# Patient Record
Sex: FEMALE
[De-identification: names, ages, dates, MRNs, and addresses within clinical notes are randomized per-mention and may not be internally consistent; named-entity substitution may affect disease eponyms.]

---

## 2017-01-20 ENCOUNTER — APPOINTMENT (OUTPATIENT)
Dept: SURGERY | Facility: CLINIC | Age: 67
End: 2017-01-20

## 2017-01-23 LAB
T3 SERPL-MCNC: 99 NG/DL
T4 FREE SERPL-MCNC: 1.8 NG/DL
THYROGLOB AB SERPL-ACNC: <20 IU/ML
THYROGLOB AB SERPL-ACNC: <20 IU/ML
THYROGLOB SERPL-MCNC: <0.2 NG/ML
THYROPEROXIDASE AB SERPL IA-ACNC: 20 IU/ML
TSH SERPL-ACNC: 0.48 UIU/ML

## 2017-09-27 ENCOUNTER — RESULT REVIEW (OUTPATIENT)
Age: 67
End: 2017-09-27

## 2018-04-11 ENCOUNTER — LABORATORY RESULT (OUTPATIENT)
Age: 68
End: 2018-04-11

## 2018-04-11 ENCOUNTER — APPOINTMENT (OUTPATIENT)
Dept: SURGERY | Facility: CLINIC | Age: 68
End: 2018-04-11
Payer: MEDICARE

## 2018-04-11 PROCEDURE — 99213 OFFICE O/P EST LOW 20 MIN: CPT

## 2018-04-11 PROCEDURE — 36415 COLL VENOUS BLD VENIPUNCTURE: CPT

## 2018-04-16 LAB
T3 SERPL-MCNC: 97 NG/DL
T4 FREE SERPL-MCNC: 1.6 NG/DL
THYROGLOB AB SERPL-ACNC: <20 IU/ML
THYROGLOB SERPL-MCNC: <0.2 NG/ML
TSH SERPL-ACNC: 2.52 UIU/ML

## 2018-11-07 ENCOUNTER — RESULT REVIEW (OUTPATIENT)
Age: 68
End: 2018-11-07

## 2019-06-19 ENCOUNTER — APPOINTMENT (OUTPATIENT)
Dept: SURGERY | Facility: CLINIC | Age: 69
End: 2019-06-19
Payer: MEDICARE

## 2019-06-19 PROCEDURE — 99213 OFFICE O/P EST LOW 20 MIN: CPT

## 2019-06-19 PROCEDURE — 36415 COLL VENOUS BLD VENIPUNCTURE: CPT

## 2019-06-19 NOTE — HISTORY OF PRESENT ILLNESS
[de-identified] :  papillary thyroid cancer 1 cm removed 2006. Patient doing well denies dysphagia, change in voice, palpitation or recent illness. Patient reports ultrasounds have been normal. No recent thyroglobulin. Patient has nuclear care physician, they have been decreasing her Synthroid this was changed to 75 2 days ago after recent blood work. Importance of suppressed TSH and normal T3 and T4 discussed. Patient reports ultrasound within the last year. Report not available. synthroid recently increased with 88 with normal TSH after.  Neck Us 12/2017 no suspicious findings. \par Denies recent illness, dysphagia or palpitations.

## 2019-06-19 NOTE — PHYSICAL EXAM
[de-identified] : no cervical or supraclavicular adenopathy, trachea midline, well-healed incision, no palpable masses. [Normal] : orientation to person, place, and time: normal

## 2021-06-03 ENCOUNTER — APPOINTMENT (OUTPATIENT)
Dept: SURGERY | Facility: CLINIC | Age: 71
End: 2021-06-03
Payer: MEDICARE

## 2021-06-03 PROCEDURE — 99213 OFFICE O/P EST LOW 20 MIN: CPT

## 2021-06-03 NOTE — PHYSICAL EXAM
[de-identified] : no cervical or supraclavicular adenopathy, trachea midline, well-healed incision, no palpable masses. [Normal] : no neck adenopathy [de-identified] : Skin:  normal appearance.  no rash, nodules, vesicles, or erythema,\par Musculoskeletal:  full range of motion and no deformities appreciated\par Neurological:  grossly intact\par Psychiatric:  oriented to person, place and time with appropriate affect

## 2021-06-03 NOTE — HISTORY OF PRESENT ILLNESS
[de-identified] :  papillary thyroid cancer 1 cm removed 2006. Patient doing well denies dysphagia, change in voice, palpitation or recent illness. Patient reports ultrasounds have been normal. No recent thyroglobulin. 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  synthroid  88 with normal TSH after. repeat stef 4/2021 TFTs normal.   Neck Us 12/2017 no suspicious findings. Denies recent illness, dysphagia or palpitations. bone density 2020 slightly improved.   I have reviewed all old and new data and available images.  Additional information was obtained from others present at the time of the visit to ensure the completeness of the history.\par  nasal cannula

## 2022-06-09 ENCOUNTER — LABORATORY RESULT (OUTPATIENT)
Age: 72
End: 2022-06-09

## 2022-06-09 ENCOUNTER — APPOINTMENT (OUTPATIENT)
Dept: SURGERY | Facility: CLINIC | Age: 72
End: 2022-06-09
Payer: MEDICARE

## 2022-06-09 PROCEDURE — 99213 OFFICE O/P EST LOW 20 MIN: CPT

## 2022-06-09 PROCEDURE — 36415 COLL VENOUS BLD VENIPUNCTURE: CPT

## 2022-06-09 NOTE — PHYSICAL EXAM
[de-identified] : no cervical or supraclavicular adenopathy, trachea midline, well-healed incision, no palpable masses. [Normal] : no neck adenopathy [de-identified] : Skin:  normal appearance.  no rash, nodules, vesicles, or erythema,\par Musculoskeletal:  full range of motion and no deformities appreciated\par Neurological:  grossly intact\par Psychiatric:  oriented to person, place and time with appropriate affect

## 2022-06-09 NOTE — ASSESSMENT
[FreeTextEntry1] : no evidence of recurrence on PE or prior imaging , stef sent.   RTO 1 year I have answered their questions to the best of my ability.\par

## 2022-06-09 NOTE — HISTORY OF PRESENT ILLNESS
[de-identified] :  papillary thyroid cancer 1 cm removed 2006. Patient doing well denies dysphagia, change in voice, palpitation or recent illness. Patient reports ultrasounds have been normal. No recent thyroglobulin. 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  synthroid  88 with normal TSH after. repeat stef 4/2021 TFTs normal.   Neck Us 12/2017 no suspicious findings. Denies recent illness, dysphagia or palpitations. bone density 2020 slightly improved. patient feeling well, denies recent illness.  denies palpitations.  I have reviewed all old and new data and available images.

## 2022-06-13 ENCOUNTER — NON-APPOINTMENT (OUTPATIENT)
Age: 72
End: 2022-06-13

## 2022-06-13 LAB
T3 SERPL-MCNC: 95 NG/DL
T4 FREE SERPL-MCNC: 1.8 NG/DL
THYROGLOB AB SERPL-ACNC: <20 IU/ML
THYROGLOB SERPL-MCNC: <0.2 NG/ML
TSH SERPL-ACNC: 0.5 UIU/ML

## 2023-06-21 ENCOUNTER — RX ONLY (RX ONLY)
Age: 73
End: 2023-06-21

## 2023-06-21 ENCOUNTER — OFFICE (OUTPATIENT)
Dept: URBAN - METROPOLITAN AREA CLINIC 90 | Facility: CLINIC | Age: 73
Setting detail: OPHTHALMOLOGY
End: 2023-06-21
Payer: COMMERCIAL

## 2023-06-21 DIAGNOSIS — H18.593: ICD-10-CM

## 2023-06-21 DIAGNOSIS — H25.13: ICD-10-CM

## 2023-06-21 PROBLEM — H18.592 UNSPECIFIED HEREDITARY CORNEAL DYSTROPHIES: Status: ACTIVE | Noted: 2023-06-21

## 2023-06-21 PROBLEM — H18.591 UNSPECIFIED HEREDITARY CORNEAL DYSTROPHIES: Status: ACTIVE | Noted: 2023-06-21

## 2023-06-21 PROCEDURE — 92004 COMPRE OPH EXAM NEW PT 1/>: CPT | Performed by: OPHTHALMOLOGY

## 2023-06-21 ASSESSMENT — REFRACTION_AUTOREFRACTION
OS_SPHERE: +0.25
OD_SPHERE: +2.00
OS_CYLINDER: -1.25
OS_AXIS: 106
OD_CYLINDER: -1.51

## 2023-06-21 ASSESSMENT — KERATOMETRY
OS_K1POWER_DIOPTERS: 2.50
OS_AXISANGLE_DEGREES: 126
OD_K1POWER_DIOPTERS: 42.50
OS_K2POWER_DIOPTERS: 43.25
OD_K2POWER_DIOPTERS: 42.50
OD_AXISANGLE_DEGREES: 904

## 2023-06-21 ASSESSMENT — VISUAL ACUITY
OS_BCVA: 20/50
OD_BCVA: 20/70-2

## 2023-06-21 ASSESSMENT — SPHEQUIV_DERIVED
OS_SPHEQUIV: -0.375
OS_SPHEQUIV: -1.125
OD_SPHEQUIV: 1.245
OD_SPHEQUIV: 0.875

## 2023-06-21 ASSESSMENT — REFRACTION_CURRENTRX
OD_OVR_VA: 20/
OS_SPHERE: +2.25
OS_OVR_VA: 20/
OS_AXIS: 080
OS_CYLINDER: -1.75
OD_VPRISM_DIRECTION: SV
OD_AXIS: 070
OD_CYLINDER: -1.00
OD_SPHERE: +2.75
OS_VPRISM_DIRECTION: SV

## 2023-06-21 ASSESSMENT — REFRACTION_MANIFEST
OD_AXIS: 105
OD_VA1: 20/60
OD_SPHERE: +1.50
OS_AXIS: 100
OS_SPHERE: -0.50
OS_VA1: 20/80-
OD_CYLINDER: -1.25
OS_CYLINDER: -1.25

## 2023-06-21 ASSESSMENT — TONOMETRY
OS_IOP_MMHG: 18
OD_IOP_MMHG: 18

## 2023-06-21 ASSESSMENT — AXIALLENGTH_DERIVED
OD_AL: 23.6183
OD_AL: 23.47
OS_AL: 35.73
OS_AL: 35.07

## 2023-06-21 ASSESSMENT — CONFRONTATIONAL VISUAL FIELD TEST (CVF)
OS_FINDINGS: FULL
OD_FINDINGS: FULL

## 2023-08-02 ENCOUNTER — OFFICE (OUTPATIENT)
Dept: URBAN - METROPOLITAN AREA CLINIC 90 | Facility: CLINIC | Age: 73
Setting detail: OPHTHALMOLOGY
End: 2023-08-02
Payer: COMMERCIAL

## 2023-08-02 DIAGNOSIS — H25.12: ICD-10-CM

## 2023-08-02 DIAGNOSIS — H25.13: ICD-10-CM

## 2023-08-02 PROCEDURE — 92136 OPHTHALMIC BIOMETRY: CPT | Performed by: OPHTHALMOLOGY

## 2023-08-02 ASSESSMENT — REFRACTION_CURRENTRX
OS_OVR_VA: 20/
OS_CYLINDER: -1.75
OD_AXIS: 070
OD_OVR_VA: 20/
OS_AXIS: 080
OS_SPHERE: +2.25
OS_VPRISM_DIRECTION: SV
OD_CYLINDER: -1.00
OD_SPHERE: +2.75
OD_VPRISM_DIRECTION: SV

## 2023-08-02 ASSESSMENT — KERATOMETRY
OS_AXISANGLE_DEGREES: 125
OD_K1POWER_DIOPTERS: 42.75
OD_K2POWER_DIOPTERS: 42.75
OS_K1POWER_DIOPTERS: 42.75
OD_AXISANGLE_DEGREES: 090
OS_K2POWER_DIOPTERS: 42.25

## 2023-08-02 ASSESSMENT — SPHEQUIV_DERIVED
OS_SPHEQUIV: -0.25
OD_SPHEQUIV: 0.875
OS_SPHEQUIV: -1.125
OD_SPHEQUIV: 1

## 2023-08-02 ASSESSMENT — REFRACTION_AUTOREFRACTION
OS_AXIS: 102
OD_CYLINDER: -1.00
OD_AXIS: 129
OD_SPHERE: +1.50
OS_SPHERE: +0.50
OS_CYLINDER: -1.50

## 2023-08-02 ASSESSMENT — REFRACTION_MANIFEST
OD_SPHERE: +1.50
OS_SPHERE: -0.50
OD_AXIS: 105
OD_VA1: 20/60
OS_VA1: 20/80-
OS_CYLINDER: -1.25
OS_AXIS: 100
OD_CYLINDER: -1.25

## 2023-08-02 ASSESSMENT — AXIALLENGTH_DERIVED
OS_AL: 24.066
OD_AL: 23.4783
OS_AL: 24.426
OD_AL: 23.5267

## 2023-08-02 ASSESSMENT — VISUAL ACUITY
OS_BCVA: 20/50-2
OD_BCVA: 20/70-2

## 2023-08-02 ASSESSMENT — CONFRONTATIONAL VISUAL FIELD TEST (CVF)
OS_FINDINGS: FULL
OD_FINDINGS: FULL

## 2023-08-07 ENCOUNTER — AMBULATORY SURGERY CENTER (OUTPATIENT)
Dept: URBAN - METROPOLITAN AREA SURGERY 25 | Facility: SURGERY | Age: 73
Setting detail: OPHTHALMOLOGY
End: 2023-08-07
Payer: COMMERCIAL

## 2023-08-07 DIAGNOSIS — H25.22: ICD-10-CM

## 2023-08-07 DIAGNOSIS — H52.212: ICD-10-CM

## 2023-08-07 PROCEDURE — FEMTO FEMTOSECOND LASER: Performed by: OPHTHALMOLOGY

## 2023-08-07 PROCEDURE — 66984 XCAPSL CTRC RMVL W/O ECP: CPT | Performed by: OPHTHALMOLOGY

## 2023-08-08 ENCOUNTER — OFFICE (OUTPATIENT)
Dept: URBAN - METROPOLITAN AREA CLINIC 90 | Facility: CLINIC | Age: 73
Setting detail: OPHTHALMOLOGY
End: 2023-08-08
Payer: COMMERCIAL

## 2023-08-08 DIAGNOSIS — Z96.1: ICD-10-CM

## 2023-08-08 PROCEDURE — 99024 POSTOP FOLLOW-UP VISIT: CPT | Performed by: OPHTHALMOLOGY

## 2023-08-08 ASSESSMENT — KERATOMETRY
OD_AXISANGLE_DEGREES: 090
OD_K2POWER_DIOPTERS: 42.75
OS_K1POWER_DIOPTERS: 42.75
OS_AXISANGLE_DEGREES: 125
OD_K1POWER_DIOPTERS: 42.75
OS_K2POWER_DIOPTERS: 42.25

## 2023-08-08 ASSESSMENT — REFRACTION_CURRENTRX
OD_SPHERE: +2.75
OD_AXIS: 070
OD_OVR_VA: 20/
OS_OVR_VA: 20/
OS_VPRISM_DIRECTION: SV
OD_VPRISM_DIRECTION: SV
OS_AXIS: 080
OD_CYLINDER: -1.00
OS_CYLINDER: -1.75
OS_SPHERE: +2.25

## 2023-08-08 ASSESSMENT — CONFRONTATIONAL VISUAL FIELD TEST (CVF)
OS_FINDINGS: FULL
OD_FINDINGS: FULL

## 2023-08-08 ASSESSMENT — REFRACTION_AUTOREFRACTION
OS_CYLINDER: -1.50
OS_AXIS: 102
OS_SPHERE: +0.50
OD_AXIS: 129
OD_CYLINDER: -1.00
OD_SPHERE: +1.50

## 2023-08-08 ASSESSMENT — REFRACTION_MANIFEST
OD_SPHERE: +1.50
OD_VA1: 20/60
OS_SPHERE: -0.50
OD_AXIS: 105
OS_AXIS: 100
OD_CYLINDER: -1.25
OS_VA1: 20/80-
OS_CYLINDER: -1.25

## 2023-08-08 ASSESSMENT — TONOMETRY: OS_IOP_MMHG: 20

## 2023-08-08 ASSESSMENT — SPHEQUIV_DERIVED
OD_SPHEQUIV: 1
OS_SPHEQUIV: -0.25
OS_SPHEQUIV: -1.125
OD_SPHEQUIV: 0.875

## 2023-08-08 ASSESSMENT — AXIALLENGTH_DERIVED
OS_AL: 24.066
OS_AL: 24.426
OD_AL: 23.4783
OD_AL: 23.5267

## 2023-08-08 ASSESSMENT — VISUAL ACUITY
OS_BCVA: 20/70-2
OD_BCVA: 20/40-2

## 2023-08-08 ASSESSMENT — CORNEAL EDEMA CLINICAL DESCRIPTION: OS_CORNEALEDEMA: DEEP FOLDS, CENTRALLY

## 2023-08-08 ASSESSMENT — CORNEAL EDEMA - FOLDS/STRIAE: OS_FOLDSSTRIAE: 1+

## 2023-08-15 ENCOUNTER — RX ONLY (RX ONLY)
Age: 73
End: 2023-08-15

## 2023-08-15 ENCOUNTER — OFFICE (OUTPATIENT)
Dept: URBAN - METROPOLITAN AREA CLINIC 90 | Facility: CLINIC | Age: 73
Setting detail: OPHTHALMOLOGY
End: 2023-08-15
Payer: COMMERCIAL

## 2023-08-15 DIAGNOSIS — H25.11: ICD-10-CM

## 2023-08-15 DIAGNOSIS — Z96.1: ICD-10-CM

## 2023-08-15 PROCEDURE — 99024 POSTOP FOLLOW-UP VISIT: CPT | Performed by: OPHTHALMOLOGY

## 2023-08-15 ASSESSMENT — KERATOMETRY
OS_K2POWER_DIOPTERS: 42.25
OS_K1POWER_DIOPTERS: 42.75
OD_K2POWER_DIOPTERS: 42.75
OD_K1POWER_DIOPTERS: 42.75
OS_AXISANGLE_DEGREES: 125
OD_AXISANGLE_DEGREES: 090

## 2023-08-15 ASSESSMENT — REFRACTION_CURRENTRX
OD_CYLINDER: -1.00
OS_CYLINDER: -1.75
OS_OVR_VA: 20/
OS_SPHERE: +2.25
OS_VPRISM_DIRECTION: SV
OD_VPRISM_DIRECTION: SV
OD_OVR_VA: 20/
OD_AXIS: 070
OD_SPHERE: +2.75
OS_AXIS: 080

## 2023-08-15 ASSESSMENT — VISUAL ACUITY
OS_BCVA: 20/70-2
OD_BCVA: 20/30-1

## 2023-08-15 ASSESSMENT — CORNEAL EDEMA CLINICAL DESCRIPTION: OS_CORNEALEDEMA: T OVER THE INCISION

## 2023-08-15 ASSESSMENT — REFRACTION_MANIFEST
OD_VA1: 20/60
OD_SPHERE: +1.50
OD_AXIS: 105
OD_CYLINDER: -1.25
OS_VA1: 20/80-
OS_SPHERE: -0.50
OS_AXIS: 100
OS_CYLINDER: -1.25

## 2023-08-15 ASSESSMENT — SPHEQUIV_DERIVED
OD_SPHEQUIV: 1
OD_SPHEQUIV: 0.875
OS_SPHEQUIV: -1.125
OS_SPHEQUIV: -0.25

## 2023-08-15 ASSESSMENT — REFRACTION_AUTOREFRACTION
OS_SPHERE: +0.50
OS_AXIS: 102
OD_AXIS: 129
OS_CYLINDER: -1.50
OD_CYLINDER: -1.00
OD_SPHERE: +1.50

## 2023-08-15 ASSESSMENT — AXIALLENGTH_DERIVED
OD_AL: 23.4783
OS_AL: 24.066
OD_AL: 23.5267
OS_AL: 24.426

## 2023-08-15 ASSESSMENT — TONOMETRY: OS_IOP_MMHG: 20

## 2023-08-15 ASSESSMENT — CONFRONTATIONAL VISUAL FIELD TEST (CVF)
OD_FINDINGS: FULL
OS_FINDINGS: FULL

## 2023-08-15 ASSESSMENT — CORNEAL EDEMA - FOLDS/STRIAE: OS_FOLDSSTRIAE: ABSENT

## 2023-09-05 ENCOUNTER — OFFICE (OUTPATIENT)
Dept: URBAN - METROPOLITAN AREA CLINIC 90 | Facility: CLINIC | Age: 73
Setting detail: OPHTHALMOLOGY
End: 2023-09-05
Payer: COMMERCIAL

## 2023-09-05 DIAGNOSIS — Z96.1: ICD-10-CM

## 2023-09-05 PROBLEM — H25.11 CATARACT SENILE NUCLEAR SCLEROSIS; RIGHT EYE: Status: ACTIVE | Noted: 2023-08-15

## 2023-09-05 PROCEDURE — 99024 POSTOP FOLLOW-UP VISIT: CPT | Performed by: OPHTHALMOLOGY

## 2023-09-05 ASSESSMENT — KERATOMETRY
OD_K2POWER_DIOPTERS: 42.50
METHOD_AUTO_MANUAL: AUTO
OS_K2POWER_DIOPTERS: 43.75
OS_AXISANGLE_DEGREES: 111
OD_K1POWER_DIOPTERS: 42.50
OD_AXISANGLE_DEGREES: 090
OS_K1POWER_DIOPTERS: 42.75

## 2023-09-05 ASSESSMENT — REFRACTION_CURRENTRX
OD_CYLINDER: -1.00
OS_SPHERE: +2.25
OD_OVR_VA: 20/
OD_VPRISM_DIRECTION: SV
OS_OVR_VA: 20/
OS_AXIS: 080
OS_CYLINDER: -1.75
OD_AXIS: 070
OD_SPHERE: +2.75
OS_VPRISM_DIRECTION: SV

## 2023-09-05 ASSESSMENT — CONFRONTATIONAL VISUAL FIELD TEST (CVF)
OD_FINDINGS: FULL
OS_FINDINGS: FULL

## 2023-09-05 ASSESSMENT — AXIALLENGTH_DERIVED
OD_AL: 23.6183
OD_AL: 23.4247
OS_AL: 23.3454
OS_AL: 24.1342

## 2023-09-05 ASSESSMENT — SPHEQUIV_DERIVED
OS_SPHEQUIV: -1.125
OS_SPHEQUIV: 0.875
OD_SPHEQUIV: 0.875
OD_SPHEQUIV: 1.375

## 2023-09-05 ASSESSMENT — REFRACTION_MANIFEST
OS_AXIS: 100
OD_AXIS: 105
OD_VA1: 20/60
OD_SPHERE: +1.50
OS_VA1: 20/80-
OD_CYLINDER: -1.25
OS_CYLINDER: -1.25
OS_SPHERE: -0.50

## 2023-09-05 ASSESSMENT — REFRACTION_AUTOREFRACTION
OS_AXIS: 053
OS_CYLINDER: -1.25
OD_SPHERE: +1.75
OD_CYLINDER: -0.75
OS_SPHERE: +1.50
OD_AXIS: 122

## 2023-09-05 ASSESSMENT — TONOMETRY: OS_IOP_MMHG: 12

## 2023-09-05 ASSESSMENT — CORNEAL EDEMA CLINICAL DESCRIPTION: OS_CORNEALEDEMA: ABSENT

## 2023-09-05 ASSESSMENT — CORNEAL EDEMA - FOLDS/STRIAE: OS_FOLDSSTRIAE: ABSENT

## 2023-09-05 ASSESSMENT — VISUAL ACUITY
OS_BCVA: 20/100+1
OD_BCVA: 20/20-1

## 2023-09-18 ENCOUNTER — OFFICE (OUTPATIENT)
Dept: URBAN - METROPOLITAN AREA CLINIC 27 | Facility: CLINIC | Age: 73
Setting detail: OPHTHALMOLOGY
End: 2023-09-18
Payer: MEDICARE

## 2023-09-18 DIAGNOSIS — H18.593: ICD-10-CM

## 2023-09-18 DIAGNOSIS — D31.32: ICD-10-CM

## 2023-09-18 DIAGNOSIS — H25.11: ICD-10-CM

## 2023-09-18 PROCEDURE — 99214 OFFICE O/P EST MOD 30 MIN: CPT | Performed by: OPHTHALMOLOGY

## 2023-09-18 PROCEDURE — 92250 FUNDUS PHOTOGRAPHY W/I&R: CPT | Performed by: OPHTHALMOLOGY

## 2023-09-18 ASSESSMENT — REFRACTION_MANIFEST
OD_CYLINDER: -1.25
OS_VA1: 20/80-
OS_SPHERE: -0.50
OD_SPHERE: +1.50
OS_CYLINDER: -1.25
OS_AXIS: 100
OD_VA1: 20/60
OD_AXIS: 105

## 2023-09-18 ASSESSMENT — KERATOMETRY
OD_K1POWER_DIOPTERS: 42.25
OD_AXISANGLE_DEGREES: 38
OS_K1POWER_DIOPTERS: 42.75
OS_AXISANGLE_DEGREES: 133
OD_K2POWER_DIOPTERS: 43.00
METHOD_AUTO_MANUAL: AUTO
OS_K2POWER_DIOPTERS: 43.50

## 2023-09-18 ASSESSMENT — REFRACTION_CURRENTRX
OD_VPRISM_DIRECTION: SV
OS_VPRISM_DIRECTION: SV
OD_OVR_VA: 20/
OD_AXIS: 070
OD_SPHERE: +2.75
OS_SPHERE: +2.25
OS_CYLINDER: -1.75
OS_AXIS: 080
OD_CYLINDER: -1.00
OS_OVR_VA: 20/

## 2023-09-18 ASSESSMENT — VISUAL ACUITY
OD_BCVA: 20/20-1
OS_BCVA: 20/50-2+2

## 2023-09-18 ASSESSMENT — AXIALLENGTH_DERIVED
OS_AL: 23.3905
OD_AL: 23.5724
OS_AL: 24.1824
OD_AL: 23.2843

## 2023-09-18 ASSESSMENT — CONFRONTATIONAL VISUAL FIELD TEST (CVF)
OS_FINDINGS: FULL
OD_FINDINGS: FULL

## 2023-09-18 ASSESSMENT — SPHEQUIV_DERIVED
OD_SPHEQUIV: 0.875
OD_SPHEQUIV: 1.625
OS_SPHEQUIV: -1.125
OS_SPHEQUIV: 0.875

## 2023-09-18 ASSESSMENT — CORNEAL DYSTROPHY
OD_DYSTROPHY: PERIPHERAL
OS_DYSTROPHY: PERIPHERAL

## 2023-09-18 ASSESSMENT — REFRACTION_AUTOREFRACTION
OD_SPHERE: +1.25
OS_SPHERE: +0.25
OD_AXIS: 28
OD_CYLINDER: +0.75
OS_CYLINDER: +1.25
OS_AXIS: 144

## 2023-09-18 ASSESSMENT — TONOMETRY
OD_IOP_MMHG: 18
OS_IOP_MMHG: 15

## 2023-10-26 ENCOUNTER — OFFICE (OUTPATIENT)
Dept: URBAN - METROPOLITAN AREA CLINIC 27 | Facility: CLINIC | Age: 73
Setting detail: OPHTHALMOLOGY
End: 2023-10-26
Payer: MEDICARE

## 2023-10-26 ENCOUNTER — APPOINTMENT (OUTPATIENT)
Dept: SURGERY | Facility: CLINIC | Age: 73
End: 2023-10-26
Payer: MEDICARE

## 2023-10-26 DIAGNOSIS — E89.0 POSTPROCEDURAL HYPOTHYROIDISM: ICD-10-CM

## 2023-10-26 DIAGNOSIS — C73 MALIGNANT NEOPLASM OF THYROID GLAND: ICD-10-CM

## 2023-10-26 DIAGNOSIS — H25.11: ICD-10-CM

## 2023-10-26 PROCEDURE — 99213 OFFICE O/P EST LOW 20 MIN: CPT

## 2023-10-26 PROCEDURE — 92136 OPHTHALMIC BIOMETRY: CPT | Mod: RT | Performed by: OPHTHALMOLOGY

## 2023-10-26 ASSESSMENT — CORNEAL DYSTROPHY
OD_DYSTROPHY: PERIPHERAL
OS_DYSTROPHY: PERIPHERAL

## 2023-10-26 ASSESSMENT — REFRACTION_AUTOREFRACTION
OD_AXIS: 028
OD_SPHERE: +1.00
OS_CYLINDER: +0.50
OS_AXIS: 155
OS_SPHERE: +0.75
OD_CYLINDER: +0.50

## 2023-10-26 ASSESSMENT — REFRACTION_MANIFEST
OS_SPHERE: -0.50
OD_CYLINDER: -1.25
OD_SPHERE: +1.50
OD_VA1: 20/60
OS_CYLINDER: -1.25
OD_AXIS: 105
OS_AXIS: 100
OS_VA1: 20/80-

## 2023-10-26 ASSESSMENT — KERATOMETRY
METHOD_AUTO_MANUAL: AUTO
OS_K1POWER_DIOPTERS: 43.00
OD_K2POWER_DIOPTERS: 42.75
OD_AXISANGLE_DEGREES: 090
OS_AXISANGLE_DEGREES: 124
OS_K2POWER_DIOPTERS: 43.50
OD_K1POWER_DIOPTERS: 42.25

## 2023-10-26 ASSESSMENT — AXIALLENGTH_DERIVED
OD_AL: 23.4728
OS_AL: 23.2978
OS_AL: 24.1342
OD_AL: 23.6183

## 2023-10-26 ASSESSMENT — REFRACTION_CURRENTRX
OD_AXIS: 070
OS_OVR_VA: 20/
OS_CYLINDER: -1.75
OS_SPHERE: +2.25
OS_AXIS: 080
OD_SPHERE: +2.75
OD_OVR_VA: 20/
OS_VPRISM_DIRECTION: SV
OD_CYLINDER: -1.00
OD_VPRISM_DIRECTION: SV

## 2023-10-26 ASSESSMENT — VISUAL ACUITY
OS_BCVA: 20/50-2
OD_BCVA: 20/25-2

## 2023-10-26 ASSESSMENT — TONOMETRY: OS_IOP_MMHG: 11

## 2023-10-26 ASSESSMENT — SPHEQUIV_DERIVED
OD_SPHEQUIV: 0.875
OD_SPHEQUIV: 1.25
OS_SPHEQUIV: -1.125
OS_SPHEQUIV: 1

## 2023-11-02 ENCOUNTER — AMBULATORY SURGERY CENTER (OUTPATIENT)
Dept: URBAN - METROPOLITAN AREA SURGERY 19 | Facility: SURGERY | Age: 73
Setting detail: OPHTHALMOLOGY
End: 2023-11-02
Payer: MEDICARE

## 2023-11-02 DIAGNOSIS — H25.11: ICD-10-CM

## 2023-11-02 DIAGNOSIS — H52.211: ICD-10-CM

## 2023-11-02 PROCEDURE — FEMTO FEMTOSECOND LASER: Mod: GY | Performed by: OPHTHALMOLOGY

## 2023-11-02 PROCEDURE — 66984 XCAPSL CTRC RMVL W/O ECP: CPT | Mod: 79,RT | Performed by: OPHTHALMOLOGY

## 2023-11-03 ENCOUNTER — OFFICE (OUTPATIENT)
Dept: URBAN - METROPOLITAN AREA CLINIC 27 | Facility: CLINIC | Age: 73
Setting detail: OPHTHALMOLOGY
End: 2023-11-03
Payer: MEDICARE

## 2023-11-03 ENCOUNTER — RX ONLY (RX ONLY)
Age: 73
End: 2023-11-03

## 2023-11-03 DIAGNOSIS — Z96.1: ICD-10-CM

## 2023-11-03 PROCEDURE — 99024 POSTOP FOLLOW-UP VISIT: CPT

## 2023-11-03 ASSESSMENT — REFRACTION_MANIFEST
OD_VA1: 20/60
OS_VA1: 20/80-
OS_CYLINDER: -1.25
OS_SPHERE: -0.50
OD_SPHERE: +1.50
OS_AXIS: 100
OD_AXIS: 105
OD_CYLINDER: -1.25

## 2023-11-03 ASSESSMENT — REFRACTION_CURRENTRX
OD_VPRISM_DIRECTION: SV
OD_AXIS: 070
OS_CYLINDER: -1.75
OD_SPHERE: +2.75
OS_VPRISM_DIRECTION: SV
OS_OVR_VA: 20/
OS_SPHERE: +2.25
OD_CYLINDER: -1.00
OD_OVR_VA: 20/
OS_AXIS: 080

## 2023-11-03 ASSESSMENT — REFRACTION_AUTOREFRACTION
OD_SPHERE: +0.75
OS_SPHERE: +0.50
OS_AXIS: 148
OS_CYLINDER: +1.00
OD_AXIS: 049
OD_CYLINDER: +0.50

## 2023-11-03 ASSESSMENT — CORNEAL EDEMA CLINICAL DESCRIPTION: OD_CORNEALEDEMA: T

## 2023-11-03 ASSESSMENT — SPHEQUIV_DERIVED
OS_SPHEQUIV: -1.125
OD_SPHEQUIV: 0.875
OD_SPHEQUIV: 1
OS_SPHEQUIV: 1

## 2023-11-03 ASSESSMENT — CORNEAL DYSTROPHY
OD_DYSTROPHY: PERIPHERAL
OS_DYSTROPHY: PERIPHERAL

## 2023-11-03 ASSESSMENT — CORNEAL EDEMA - MICROCYSTIC EPITHELIAL EDEMA (MCE): OD_MCE: T

## 2023-11-09 ENCOUNTER — OFFICE (OUTPATIENT)
Dept: URBAN - METROPOLITAN AREA CLINIC 27 | Facility: CLINIC | Age: 73
Setting detail: OPHTHALMOLOGY
End: 2023-11-09
Payer: MEDICARE

## 2023-11-09 DIAGNOSIS — Z96.1: ICD-10-CM

## 2023-11-09 PROCEDURE — 99024 POSTOP FOLLOW-UP VISIT: CPT | Performed by: OPHTHALMOLOGY

## 2023-11-09 ASSESSMENT — CORNEAL EDEMA - MICROCYSTIC EPITHELIAL EDEMA (MCE): OD_MCE: T

## 2023-11-09 ASSESSMENT — REFRACTION_AUTOREFRACTION
OS_CYLINDER: +1.25
OS_SPHERE: +0.50
OD_AXIS: 036
OD_CYLINDER: +0.75
OD_SPHERE: +1.00
OS_AXIS: 151

## 2023-11-09 ASSESSMENT — REFRACTION_CURRENTRX
OD_VPRISM_DIRECTION: SV
OD_OVR_VA: 20/
OS_OVR_VA: 20/
OD_CYLINDER: -1.00
OD_SPHERE: +2.75
OS_SPHERE: +2.25
OS_VPRISM_DIRECTION: SV
OS_AXIS: 080
OD_AXIS: 070
OS_CYLINDER: -1.75

## 2023-11-09 ASSESSMENT — REFRACTION_MANIFEST
OS_SPHERE: -0.50
OD_CYLINDER: -1.25
OD_VA1: 20/60
OD_AXIS: 105
OS_CYLINDER: -1.25
OD_SPHERE: +1.50
OS_VA1: 20/80-
OS_AXIS: 100

## 2023-11-09 ASSESSMENT — CORNEAL DYSTROPHY
OS_DYSTROPHY: PERIPHERAL
OD_DYSTROPHY: PERIPHERAL

## 2023-11-09 ASSESSMENT — SPHEQUIV_DERIVED
OD_SPHEQUIV: 1.375
OD_SPHEQUIV: 0.875
OS_SPHEQUIV: 1.125
OS_SPHEQUIV: -1.125

## 2023-11-30 ENCOUNTER — OFFICE (OUTPATIENT)
Dept: URBAN - METROPOLITAN AREA CLINIC 27 | Facility: CLINIC | Age: 73
Setting detail: OPHTHALMOLOGY
End: 2023-11-30
Payer: MEDICARE

## 2023-11-30 DIAGNOSIS — Z96.1: ICD-10-CM

## 2023-11-30 PROCEDURE — 99024 POSTOP FOLLOW-UP VISIT: CPT | Performed by: OPHTHALMOLOGY

## 2023-11-30 ASSESSMENT — REFRACTION_AUTOREFRACTION
OD_SPHERE: +0.75
OD_CYLINDER: +0.75
OS_SPHERE: +0.75
OS_AXIS: 152
OD_AXIS: 026
OS_CYLINDER: +1.00

## 2023-11-30 ASSESSMENT — CORNEAL DYSTROPHY
OD_DYSTROPHY: PERIPHERAL
OS_DYSTROPHY: PERIPHERAL

## 2023-11-30 ASSESSMENT — REFRACTION_MANIFEST
OS_CYLINDER: -1.25
OD_SPHERE: +1.50
OD_AXIS: 105
OD_VA1: 20/60
OS_VA1: 20/80-
OS_AXIS: 100
OD_CYLINDER: -1.25
OS_SPHERE: -0.50

## 2023-11-30 ASSESSMENT — REFRACTION_CURRENTRX
OS_AXIS: 080
OS_VPRISM_DIRECTION: SV
OS_SPHERE: +2.25
OS_CYLINDER: -1.75
OD_CYLINDER: -1.00
OD_AXIS: 070
OD_SPHERE: +2.75
OD_VPRISM_DIRECTION: SV
OS_OVR_VA: 20/
OD_OVR_VA: 20/

## 2023-11-30 ASSESSMENT — SPHEQUIV_DERIVED
OS_SPHEQUIV: 1.25
OD_SPHEQUIV: 1.125
OS_SPHEQUIV: -1.125
OD_SPHEQUIV: 0.875

## 2023-11-30 ASSESSMENT — CORNEAL EDEMA - MICROCYSTIC EPITHELIAL EDEMA (MCE): OD_MCE: ABSENT

## 2024-04-24 ENCOUNTER — OFFICE (OUTPATIENT)
Dept: URBAN - METROPOLITAN AREA CLINIC 90 | Facility: CLINIC | Age: 74
Setting detail: OPHTHALMOLOGY
End: 2024-04-24
Payer: MEDICARE

## 2024-04-24 DIAGNOSIS — H18.591: ICD-10-CM

## 2024-04-24 DIAGNOSIS — D31.32: ICD-10-CM

## 2024-04-24 DIAGNOSIS — H18.593: ICD-10-CM

## 2024-04-24 DIAGNOSIS — H43.812: ICD-10-CM

## 2024-04-24 DIAGNOSIS — H18.592: ICD-10-CM

## 2024-04-24 DIAGNOSIS — Z96.1: ICD-10-CM

## 2024-04-24 PROCEDURE — 92014 COMPRE OPH EXAM EST PT 1/>: CPT | Performed by: OPHTHALMOLOGY

## 2024-06-05 ENCOUNTER — OFFICE (OUTPATIENT)
Dept: URBAN - METROPOLITAN AREA CLINIC 90 | Facility: CLINIC | Age: 74
Setting detail: OPHTHALMOLOGY
End: 2024-06-05
Payer: MEDICARE

## 2024-06-05 DIAGNOSIS — D31.32: ICD-10-CM

## 2024-06-05 DIAGNOSIS — H43.812: ICD-10-CM

## 2024-06-05 DIAGNOSIS — H18.593: ICD-10-CM

## 2024-06-05 PROCEDURE — 92014 COMPRE OPH EXAM EST PT 1/>: CPT | Performed by: OPHTHALMOLOGY

## 2024-06-05 ASSESSMENT — CONFRONTATIONAL VISUAL FIELD TEST (CVF)
OS_FINDINGS: FULL
OD_FINDINGS: FULL

## 2024-10-07 ENCOUNTER — OFFICE (OUTPATIENT)
Dept: URBAN - METROPOLITAN AREA CLINIC 27 | Facility: CLINIC | Age: 74
Setting detail: OPHTHALMOLOGY
End: 2024-10-07
Payer: MEDICARE

## 2024-10-07 DIAGNOSIS — H18.593: ICD-10-CM

## 2024-10-07 DIAGNOSIS — H43.812: ICD-10-CM

## 2024-10-07 DIAGNOSIS — H02.89: ICD-10-CM

## 2024-10-07 PROCEDURE — 99213 OFFICE O/P EST LOW 20 MIN: CPT | Performed by: OPHTHALMOLOGY

## 2024-10-07 ASSESSMENT — REFRACTION_CURRENTRX
OD_VPRISM_DIRECTION: SV
OS_AXIS: 180
OS_OVR_VA: 20/
OS_SPHERE: +2.25
OS_VPRISM_DIRECTION: SV
OD_CYLINDER: 0.00
OS_OVR_VA: 20/
OD_OVR_VA: 20/
OS_SPHERE: +3.50
OS_AXIS: 080
OD_OVR_VA: 20/
OS_CYLINDER: 0.00
OD_SPHERE: +3.50
OD_SPHERE: +2.75
OS_CYLINDER: -1.75
OD_AXIS: 180
OD_AXIS: 070
OD_CYLINDER: -1.00

## 2024-10-07 ASSESSMENT — VISUAL ACUITY
OS_BCVA: 20/25-2
OD_BCVA: 20/25-1

## 2024-10-07 ASSESSMENT — CONFRONTATIONAL VISUAL FIELD TEST (CVF)
OD_FINDINGS: FULL
OS_FINDINGS: FULL

## 2024-10-07 ASSESSMENT — KERATOMETRY
OS_K1POWER_DIOPTERS: 43.00
OD_K1POWER_DIOPTERS: 42.50
OD_AXISANGLE_DEGREES: 0
OD_K2POWER_DIOPTERS: 42.50
OS_AXISANGLE_DEGREES: 125
METHOD_AUTO_MANUAL: AUTO
OS_K2POWER_DIOPTERS: 43.50

## 2024-10-07 ASSESSMENT — REFRACTION_MANIFEST
OD_VA1: 20/60
OS_CYLINDER: -1.25
OD_AXIS: 105
OS_VA1: 20/80-
OD_CYLINDER: -1.25
OD_SPHERE: +1.50
OS_SPHERE: -0.50
OS_AXIS: 100

## 2024-10-07 ASSESSMENT — CORNEAL DYSTROPHY
OD_DYSTROPHY: PERIPHERAL
OS_DYSTROPHY: PERIPHERAL

## 2024-10-07 ASSESSMENT — REFRACTION_AUTOREFRACTION
OS_CYLINDER: +0.75
OD_CYLINDER: +1.00
OD_SPHERE: +1.25
OS_AXIS: 162
OD_AXIS: 21
OS_SPHERE: +1.00

## 2024-10-07 ASSESSMENT — LID EXAM ASSESSMENTS
OD_MEIBOMITIS: 2+
OS_MEIBOMITIS: 2+

## 2024-10-07 ASSESSMENT — TONOMETRY
OD_IOP_MMHG: 16
OS_IOP_MMHG: 17

## 2024-10-07 ASSESSMENT — CORNEAL EDEMA - MICROCYSTIC EPITHELIAL EDEMA (MCE): OD_MCE: ABSENT

## 2025-04-30 NOTE — ASU PATIENT PROFILE, ADULT - FALL HARM RISK - HARM RISK INTERVENTIONS

## 2025-04-30 NOTE — ASU PATIENT PROFILE, ADULT - AS SC BRADEN SENSORY
Episodes of uncontrolled nausea or vomiting not relieved by anti-nausea medication
(4) no impairment

## 2025-04-30 NOTE — ASU PATIENT PROFILE, ADULT - HISTORY OF COVID-19 VACCINATION
Vaccine status unknown Zoryve Counseling:  I discussed with the patient that Zoryve is not for use in the eyes, mouth or vagina. The most commonly reported side effects include diarrhea, headache, insomnia, application site pain, upper respiratory tract infections, and urinary tract infections.  All of the patient's questions and concerns were addressed.

## 2025-05-01 ENCOUNTER — OUTPATIENT (OUTPATIENT)
Dept: OUTPATIENT SERVICES | Facility: HOSPITAL | Age: 75
LOS: 1 days | End: 2025-05-01
Payer: MEDICARE

## 2025-05-01 VITALS
OXYGEN SATURATION: 99 % | HEART RATE: 88 BPM | WEIGHT: 132.06 LBS | TEMPERATURE: 98 F | RESPIRATION RATE: 19 BRPM | HEIGHT: 59 IN | SYSTOLIC BLOOD PRESSURE: 137 MMHG | DIASTOLIC BLOOD PRESSURE: 84 MMHG

## 2025-05-01 VITALS
RESPIRATION RATE: 18 BRPM | HEART RATE: 60 BPM | OXYGEN SATURATION: 98 % | SYSTOLIC BLOOD PRESSURE: 115 MMHG | DIASTOLIC BLOOD PRESSURE: 67 MMHG

## 2025-05-01 DIAGNOSIS — Z98.890 OTHER SPECIFIED POSTPROCEDURAL STATES: Chronic | ICD-10-CM

## 2025-05-01 DIAGNOSIS — Z12.11 ENCOUNTER FOR SCREENING FOR MALIGNANT NEOPLASM OF COLON: ICD-10-CM

## 2025-05-01 DIAGNOSIS — Z90.710 ACQUIRED ABSENCE OF BOTH CERVIX AND UTERUS: Chronic | ICD-10-CM

## 2025-05-01 PROCEDURE — 88342 IMHCHEM/IMCYTCHM 1ST ANTB: CPT | Mod: 26

## 2025-05-01 PROCEDURE — 88305 TISSUE EXAM BY PATHOLOGIST: CPT | Mod: 26

## 2025-05-01 RX ORDER — LEVOTHYROXINE SODIUM 300 MCG
0 TABLET ORAL
Refills: 0 | DISCHARGE

## 2025-05-01 RX ORDER — SODIUM CHLORIDE 9 G/1000ML
1000 INJECTION, SOLUTION INTRAVENOUS
Refills: 0 | Status: DISCONTINUED | OUTPATIENT
Start: 2025-05-01 | End: 2025-05-01

## 2025-05-01 RX ORDER — AMLODIPINE AND OLMESARTAN MEDOXOMIL 5; 40 MG/1; MG/1
1 TABLET ORAL
Refills: 0 | DISCHARGE

## 2025-05-01 RX ORDER — ASPIRIN 325 MG
0 TABLET ORAL
Refills: 0 | DISCHARGE

## 2025-05-01 RX ORDER — B1/B2/B3/B5/B6/B12/VIT C/FOLIC 500-0.5 MG
0 TABLET ORAL
Refills: 0 | DISCHARGE

## 2025-05-01 RX ADMIN — SODIUM CHLORIDE 75 MILLILITER(S): 9 INJECTION, SOLUTION INTRAVENOUS at 08:08

## 2025-05-01 NOTE — ASU PREOP CHECKLIST - PATIENT PROBLEMS/NEEDS
[NL] : regular rate and rhythm, normal S1, S2 audible, no murmurs [de-identified] : face with areas of erythem PV eruption, with some linearity. Fingers with some rash as well h/o polyps

## 2025-05-05 LAB — SURGICAL PATHOLOGY STUDY: SIGNIFICANT CHANGE UP

## 2025-06-06 ENCOUNTER — OFFICE (OUTPATIENT)
Facility: LOCATION | Age: 75
Setting detail: OPHTHALMOLOGY
End: 2025-06-06
Payer: MEDICARE

## 2025-06-06 DIAGNOSIS — Z96.1: ICD-10-CM

## 2025-06-06 DIAGNOSIS — D31.32: ICD-10-CM

## 2025-06-06 DIAGNOSIS — H02.89: ICD-10-CM

## 2025-06-06 DIAGNOSIS — H43.812: ICD-10-CM

## 2025-06-06 DIAGNOSIS — H18.593: ICD-10-CM

## 2025-06-06 PROCEDURE — 92014 COMPRE OPH EXAM EST PT 1/>: CPT | Performed by: OPHTHALMOLOGY

## 2025-06-06 PROCEDURE — 92250 FUNDUS PHOTOGRAPHY W/I&R: CPT | Performed by: OPHTHALMOLOGY

## 2025-06-06 ASSESSMENT — LID EXAM ASSESSMENTS
OS_MEIBOMITIS: 2+
OD_MEIBOMITIS: 2+

## 2025-06-06 ASSESSMENT — KERATOMETRY
OS_K2POWER_DIOPTERS: 43.50
METHOD_AUTO_MANUAL: AUTO
OD_K2POWER_DIOPTERS: 43.00
OD_AXISANGLE_DEGREES: 064
OS_K1POWER_DIOPTERS: 42.75
OD_K1POWER_DIOPTERS: 42.75
OS_AXISANGLE_DEGREES: 121

## 2025-06-06 ASSESSMENT — REFRACTION_CURRENTRX
OS_OVR_VA: 20/
OS_VPRISM_DIRECTION: SV
OS_OVR_VA: 20/
OD_CYLINDER: -1.00
OS_CYLINDER: SPH
OS_AXIS: 080
OD_AXIS: 070
OD_SPHERE: +3.50
OS_OVR_VA: 20/
OS_CYLINDER: 0.00
OD_OVR_VA: 20/
OD_SPHERE: +2.75
OD_AXIS: 180
OD_VPRISM_DIRECTION: SV
OD_SPHERE: +3.50
OD_VPRISM_DIRECTION: SV
OD_OVR_VA: 20/
OD_CYLINDER: SPH
OS_SPHERE: +3.50
OS_SPHERE: +2.25
OD_CYLINDER: 0.00
OS_CYLINDER: -1.75
OS_SPHERE: +3.50
OS_VPRISM_DIRECTION: SV
OD_OVR_VA: 20/
OS_AXIS: 180

## 2025-06-06 ASSESSMENT — REFRACTION_AUTOREFRACTION
OS_CYLINDER: -0.75
OS_AXIS: 054
OD_AXIS: 110
OD_CYLINDER: -1.00
OS_SPHERE: +1.75
OD_SPHERE: +2.25

## 2025-06-06 ASSESSMENT — REFRACTION_MANIFEST
OS_AXIS: 100
OD_VA1: 20/60
OD_AXIS: 105
OD_SPHERE: +1.50
OS_VA1: 20/80-
OS_SPHERE: -0.50
OS_CYLINDER: -1.25
OD_CYLINDER: -1.25

## 2025-06-06 ASSESSMENT — CORNEAL EDEMA - MICROCYSTIC EPITHELIAL EDEMA (MCE): OD_MCE: ABSENT

## 2025-06-06 ASSESSMENT — CORNEAL DYSTROPHY
OD_DYSTROPHY: PERIPHERAL
OS_DYSTROPHY: PERIPHERAL

## 2025-06-06 ASSESSMENT — CONFRONTATIONAL VISUAL FIELD TEST (CVF)
OS_FINDINGS: FULL
OD_FINDINGS: FULL

## 2025-06-06 ASSESSMENT — TONOMETRY
OS_IOP_MMHG: 16
OD_IOP_MMHG: 15

## 2025-06-06 ASSESSMENT — VISUAL ACUITY
OD_BCVA: 20/20-2
OS_BCVA: 20/25-1

## 2025-06-26 ENCOUNTER — INPATIENT (INPATIENT)
Facility: HOSPITAL | Age: 75
LOS: 1 days | Discharge: HOME | DRG: 392 | End: 2025-06-28
Attending: STUDENT IN AN ORGANIZED HEALTH CARE EDUCATION/TRAINING PROGRAM | Admitting: STUDENT IN AN ORGANIZED HEALTH CARE EDUCATION/TRAINING PROGRAM
Payer: MEDICARE

## 2025-06-26 VITALS
WEIGHT: 126.99 LBS | TEMPERATURE: 98 F | DIASTOLIC BLOOD PRESSURE: 73 MMHG | RESPIRATION RATE: 18 BRPM | OXYGEN SATURATION: 96 % | SYSTOLIC BLOOD PRESSURE: 128 MMHG | HEART RATE: 98 BPM | HEIGHT: 59 IN

## 2025-06-26 DIAGNOSIS — E87.1 HYPO-OSMOLALITY AND HYPONATREMIA: ICD-10-CM

## 2025-06-26 DIAGNOSIS — Z29.9 ENCOUNTER FOR PROPHYLACTIC MEASURES, UNSPECIFIED: ICD-10-CM

## 2025-06-26 DIAGNOSIS — Z90.710 ACQUIRED ABSENCE OF BOTH CERVIX AND UTERUS: Chronic | ICD-10-CM

## 2025-06-26 DIAGNOSIS — I10 ESSENTIAL (PRIMARY) HYPERTENSION: ICD-10-CM

## 2025-06-26 DIAGNOSIS — K57.92 DIVERTICULITIS OF INTESTINE, PART UNSPECIFIED, WITHOUT PERFORATION OR ABSCESS WITHOUT BLEEDING: ICD-10-CM

## 2025-06-26 DIAGNOSIS — Z98.890 OTHER SPECIFIED POSTPROCEDURAL STATES: Chronic | ICD-10-CM

## 2025-06-26 DIAGNOSIS — K59.00 CONSTIPATION, UNSPECIFIED: ICD-10-CM

## 2025-06-26 DIAGNOSIS — E03.9 HYPOTHYROIDISM, UNSPECIFIED: ICD-10-CM

## 2025-06-26 LAB
ALBUMIN SERPL ELPH-MCNC: 3.6 G/DL — SIGNIFICANT CHANGE UP (ref 3.3–5)
ALP SERPL-CCNC: 55 U/L — SIGNIFICANT CHANGE UP (ref 40–120)
ALT FLD-CCNC: 13 U/L — SIGNIFICANT CHANGE UP (ref 10–45)
ANION GAP SERPL CALC-SCNC: 16 MMOL/L — SIGNIFICANT CHANGE UP (ref 5–17)
APPEARANCE UR: CLEAR — SIGNIFICANT CHANGE UP
AST SERPL-CCNC: 16 U/L — SIGNIFICANT CHANGE UP (ref 10–40)
BASOPHILS # BLD AUTO: 0.04 K/UL — SIGNIFICANT CHANGE UP (ref 0–0.2)
BASOPHILS NFR BLD AUTO: 0.4 % — SIGNIFICANT CHANGE UP (ref 0–2)
BILIRUB SERPL-MCNC: 0.8 MG/DL — SIGNIFICANT CHANGE UP (ref 0.2–1.2)
BILIRUB UR-MCNC: NEGATIVE — SIGNIFICANT CHANGE UP
BUN SERPL-MCNC: 9 MG/DL — SIGNIFICANT CHANGE UP (ref 7–23)
CALCIUM SERPL-MCNC: 8.6 MG/DL — SIGNIFICANT CHANGE UP (ref 8.4–10.5)
CHLORIDE SERPL-SCNC: 90 MMOL/L — LOW (ref 96–108)
CO2 SERPL-SCNC: 18 MMOL/L — LOW (ref 22–31)
COLOR SPEC: YELLOW — SIGNIFICANT CHANGE UP
CREAT ?TM UR-MCNC: 22 MG/DL — SIGNIFICANT CHANGE UP
CREAT SERPL-MCNC: 0.53 MG/DL — SIGNIFICANT CHANGE UP (ref 0.5–1.3)
DIFF PNL FLD: NEGATIVE — SIGNIFICANT CHANGE UP
EGFR: 96 ML/MIN/1.73M2 — SIGNIFICANT CHANGE UP
EGFR: 96 ML/MIN/1.73M2 — SIGNIFICANT CHANGE UP
EOSINOPHIL # BLD AUTO: 0.01 K/UL — SIGNIFICANT CHANGE UP (ref 0–0.5)
EOSINOPHIL NFR BLD AUTO: 0.1 % — SIGNIFICANT CHANGE UP (ref 0–6)
GAS PNL BLDV: SIGNIFICANT CHANGE UP
GLUCOSE SERPL-MCNC: 108 MG/DL — HIGH (ref 70–99)
GLUCOSE UR QL: NEGATIVE MG/DL — SIGNIFICANT CHANGE UP
HCT VFR BLD CALC: 35 % — SIGNIFICANT CHANGE UP (ref 34.5–45)
HGB BLD-MCNC: 12.4 G/DL — SIGNIFICANT CHANGE UP (ref 11.5–15.5)
IMM GRANULOCYTES # BLD AUTO: 0.06 K/UL — SIGNIFICANT CHANGE UP (ref 0–0.07)
IMM GRANULOCYTES NFR BLD AUTO: 0.6 % — SIGNIFICANT CHANGE UP (ref 0–0.9)
KETONES UR QL: 15 MG/DL
LEUKOCYTE ESTERASE UR-ACNC: NEGATIVE — SIGNIFICANT CHANGE UP
LIDOCAIN IGE QN: 12 U/L — SIGNIFICANT CHANGE UP (ref 7–60)
LYMPHOCYTES # BLD AUTO: 1.1 K/UL — SIGNIFICANT CHANGE UP (ref 1–3.3)
LYMPHOCYTES NFR BLD AUTO: 10.8 % — LOW (ref 13–44)
MCHC RBC-ENTMCNC: 30 PG — SIGNIFICANT CHANGE UP (ref 27–34)
MCHC RBC-ENTMCNC: 35.4 G/DL — SIGNIFICANT CHANGE UP (ref 32–36)
MCV RBC AUTO: 84.5 FL — SIGNIFICANT CHANGE UP (ref 80–100)
MONOCYTES # BLD AUTO: 0.97 K/UL — HIGH (ref 0–0.9)
MONOCYTES NFR BLD AUTO: 9.5 % — SIGNIFICANT CHANGE UP (ref 2–14)
NEUTROPHILS # BLD AUTO: 7.98 K/UL — HIGH (ref 1.8–7.4)
NEUTROPHILS NFR BLD AUTO: 78.6 % — HIGH (ref 43–77)
NITRITE UR-MCNC: NEGATIVE — SIGNIFICANT CHANGE UP
NRBC # BLD AUTO: 0 K/UL — SIGNIFICANT CHANGE UP (ref 0–0)
NRBC # FLD: 0 K/UL — SIGNIFICANT CHANGE UP (ref 0–0)
NRBC BLD AUTO-RTO: 0 /100 WBCS — SIGNIFICANT CHANGE UP (ref 0–0)
OSMOLALITY UR: 130 MOS/KG — LOW (ref 300–900)
PH UR: 7.5 — SIGNIFICANT CHANGE UP (ref 5–8)
PLATELET # BLD AUTO: 263 K/UL — SIGNIFICANT CHANGE UP (ref 150–400)
PMV BLD: 9.1 FL — SIGNIFICANT CHANGE UP (ref 7–13)
POTASSIUM SERPL-MCNC: 3.8 MMOL/L — SIGNIFICANT CHANGE UP (ref 3.5–5.3)
POTASSIUM SERPL-SCNC: 3.8 MMOL/L — SIGNIFICANT CHANGE UP (ref 3.5–5.3)
POTASSIUM UR-SCNC: 15 MMOL/L — SIGNIFICANT CHANGE UP
PROT ?TM UR-MCNC: <7 MG/DL — SIGNIFICANT CHANGE UP (ref 0–12)
PROT SERPL-MCNC: 7.1 G/DL — SIGNIFICANT CHANGE UP (ref 6–8.3)
PROT UR-MCNC: NEGATIVE MG/DL — SIGNIFICANT CHANGE UP
PROT/CREAT UR-RTO: SIGNIFICANT CHANGE UP (ref 0–0.2)
RBC # BLD: 4.14 M/UL — SIGNIFICANT CHANGE UP (ref 3.8–5.2)
RBC # FLD: 12.8 % — SIGNIFICANT CHANGE UP (ref 10.3–14.5)
SODIUM SERPL-SCNC: 124 MMOL/L — LOW (ref 135–145)
SODIUM UR-SCNC: 27 MMOL/L — SIGNIFICANT CHANGE UP
SP GR SPEC: <1.005 — LOW (ref 1–1.03)
UROBILINOGEN FLD QL: 0.2 MG/DL — SIGNIFICANT CHANGE UP (ref 0.2–1)
WBC # BLD: 10.16 K/UL — SIGNIFICANT CHANGE UP (ref 3.8–10.5)
WBC # FLD AUTO: 10.16 K/UL — SIGNIFICANT CHANGE UP (ref 3.8–10.5)

## 2025-06-26 PROCEDURE — 82330 ASSAY OF CALCIUM: CPT

## 2025-06-26 PROCEDURE — 83935 ASSAY OF URINE OSMOLALITY: CPT

## 2025-06-26 PROCEDURE — 84132 ASSAY OF SERUM POTASSIUM: CPT

## 2025-06-26 PROCEDURE — 81003 URINALYSIS AUTO W/O SCOPE: CPT

## 2025-06-26 PROCEDURE — 85018 HEMOGLOBIN: CPT

## 2025-06-26 PROCEDURE — 84133 ASSAY OF URINE POTASSIUM: CPT

## 2025-06-26 PROCEDURE — 85025 COMPLETE CBC W/AUTO DIFF WBC: CPT

## 2025-06-26 PROCEDURE — 80053 COMPREHEN METABOLIC PANEL: CPT

## 2025-06-26 PROCEDURE — 85014 HEMATOCRIT: CPT

## 2025-06-26 PROCEDURE — 74177 CT ABD & PELVIS W/CONTRAST: CPT | Mod: 26

## 2025-06-26 PROCEDURE — 83605 ASSAY OF LACTIC ACID: CPT

## 2025-06-26 PROCEDURE — 99223 1ST HOSP IP/OBS HIGH 75: CPT

## 2025-06-26 PROCEDURE — 82947 ASSAY GLUCOSE BLOOD QUANT: CPT

## 2025-06-26 PROCEDURE — 82803 BLOOD GASES ANY COMBINATION: CPT

## 2025-06-26 PROCEDURE — 83690 ASSAY OF LIPASE: CPT

## 2025-06-26 PROCEDURE — 82570 ASSAY OF URINE CREATININE: CPT

## 2025-06-26 PROCEDURE — 99285 EMERGENCY DEPT VISIT HI MDM: CPT

## 2025-06-26 PROCEDURE — 87086 URINE CULTURE/COLONY COUNT: CPT

## 2025-06-26 PROCEDURE — 82435 ASSAY OF BLOOD CHLORIDE: CPT

## 2025-06-26 PROCEDURE — 84156 ASSAY OF PROTEIN URINE: CPT

## 2025-06-26 PROCEDURE — 74177 CT ABD & PELVIS W/CONTRAST: CPT

## 2025-06-26 PROCEDURE — 84300 ASSAY OF URINE SODIUM: CPT

## 2025-06-26 PROCEDURE — 84295 ASSAY OF SERUM SODIUM: CPT

## 2025-06-26 RX ORDER — ENOXAPARIN SODIUM 100 MG/ML
40 INJECTION SUBCUTANEOUS EVERY 24 HOURS
Refills: 0 | Status: DISCONTINUED | OUTPATIENT
Start: 2025-06-26 | End: 2025-06-28

## 2025-06-26 RX ORDER — BISACODYL 5 MG
10 TABLET, DELAYED RELEASE (ENTERIC COATED) ORAL ONCE
Refills: 0 | Status: COMPLETED | OUTPATIENT
Start: 2025-06-26 | End: 2025-06-26

## 2025-06-26 RX ORDER — MAGNESIUM, ALUMINUM HYDROXIDE 200-200 MG
30 TABLET,CHEWABLE ORAL EVERY 4 HOURS
Refills: 0 | Status: DISCONTINUED | OUTPATIENT
Start: 2025-06-26 | End: 2025-06-28

## 2025-06-26 RX ORDER — ACETAMINOPHEN 500 MG/5ML
1000 LIQUID (ML) ORAL ONCE
Refills: 0 | Status: COMPLETED | OUTPATIENT
Start: 2025-06-26 | End: 2025-06-26

## 2025-06-26 RX ORDER — MELATONIN 5 MG
3 TABLET ORAL AT BEDTIME
Refills: 0 | Status: DISCONTINUED | OUTPATIENT
Start: 2025-06-26 | End: 2025-06-28

## 2025-06-26 RX ORDER — AMPICILLIN SODIUM AND SULBACTAM SODIUM 1; .5 G/1; G/1
3 INJECTION, POWDER, FOR SOLUTION INTRAMUSCULAR; INTRAVENOUS ONCE
Refills: 0 | Status: COMPLETED | OUTPATIENT
Start: 2025-06-26 | End: 2025-06-26

## 2025-06-26 RX ORDER — ONDANSETRON HCL/PF 4 MG/2 ML
4 VIAL (ML) INJECTION EVERY 8 HOURS
Refills: 0 | Status: DISCONTINUED | OUTPATIENT
Start: 2025-06-26 | End: 2025-06-28

## 2025-06-26 RX ORDER — SODIUM CHLORIDE 9 G/1000ML
1000 INJECTION, SOLUTION INTRAVENOUS ONCE
Refills: 0 | Status: COMPLETED | OUTPATIENT
Start: 2025-06-26 | End: 2025-06-26

## 2025-06-26 RX ORDER — AMPICILLIN SODIUM AND SULBACTAM SODIUM 1; .5 G/1; G/1
3 INJECTION, POWDER, FOR SOLUTION INTRAMUSCULAR; INTRAVENOUS EVERY 6 HOURS
Refills: 0 | Status: DISCONTINUED | OUTPATIENT
Start: 2025-06-26 | End: 2025-06-28

## 2025-06-26 RX ORDER — POLYETHYLENE GLYCOL 3350 17 G/17G
17 POWDER, FOR SOLUTION ORAL ONCE
Refills: 0 | Status: COMPLETED | OUTPATIENT
Start: 2025-06-26 | End: 2025-06-26

## 2025-06-26 RX ORDER — ACETAMINOPHEN 500 MG/5ML
650 LIQUID (ML) ORAL EVERY 6 HOURS
Refills: 0 | Status: DISCONTINUED | OUTPATIENT
Start: 2025-06-26 | End: 2025-06-28

## 2025-06-26 RX ADMIN — Medication 400 MILLIGRAM(S): at 18:45

## 2025-06-26 RX ADMIN — SODIUM CHLORIDE 1000 MILLILITER(S): 9 INJECTION, SOLUTION INTRAVENOUS at 15:59

## 2025-06-26 RX ADMIN — SODIUM CHLORIDE 1000 MILLILITER(S): 9 INJECTION, SOLUTION INTRAVENOUS at 14:39

## 2025-06-26 RX ADMIN — Medication 10 MILLIGRAM(S): at 18:45

## 2025-06-26 RX ADMIN — AMPICILLIN SODIUM AND SULBACTAM SODIUM 200 GRAM(S): 1; .5 INJECTION, POWDER, FOR SOLUTION INTRAMUSCULAR; INTRAVENOUS at 19:15

## 2025-06-26 RX ADMIN — POLYETHYLENE GLYCOL 3350 17 GRAM(S): 17 POWDER, FOR SOLUTION ORAL at 18:46

## 2025-06-27 LAB
A1C WITH ESTIMATED AVERAGE GLUCOSE RESULT: 5.2 % — SIGNIFICANT CHANGE UP (ref 4–5.6)
ADD ON TEST-SPECIMEN IN LAB: SIGNIFICANT CHANGE UP
ANION GAP SERPL CALC-SCNC: 10 MMOL/L — SIGNIFICANT CHANGE UP (ref 5–17)
ANION GAP SERPL CALC-SCNC: 13 MMOL/L — SIGNIFICANT CHANGE UP (ref 5–17)
ANION GAP SERPL CALC-SCNC: 16 MMOL/L — SIGNIFICANT CHANGE UP (ref 5–17)
BUN SERPL-MCNC: 12 MG/DL — SIGNIFICANT CHANGE UP (ref 7–23)
BUN SERPL-MCNC: 8 MG/DL — SIGNIFICANT CHANGE UP (ref 7–23)
BUN SERPL-MCNC: 8 MG/DL — SIGNIFICANT CHANGE UP (ref 7–23)
CALCIUM SERPL-MCNC: 8.5 MG/DL — SIGNIFICANT CHANGE UP (ref 8.4–10.5)
CALCIUM SERPL-MCNC: 8.5 MG/DL — SIGNIFICANT CHANGE UP (ref 8.4–10.5)
CALCIUM SERPL-MCNC: 8.7 MG/DL — SIGNIFICANT CHANGE UP (ref 8.4–10.5)
CHLORIDE SERPL-SCNC: 98 MMOL/L — SIGNIFICANT CHANGE UP (ref 96–108)
CHLORIDE SERPL-SCNC: 99 MMOL/L — SIGNIFICANT CHANGE UP (ref 96–108)
CHLORIDE SERPL-SCNC: 99 MMOL/L — SIGNIFICANT CHANGE UP (ref 96–108)
CO2 SERPL-SCNC: 20 MMOL/L — LOW (ref 22–31)
CO2 SERPL-SCNC: 22 MMOL/L — SIGNIFICANT CHANGE UP (ref 22–31)
CO2 SERPL-SCNC: 22 MMOL/L — SIGNIFICANT CHANGE UP (ref 22–31)
CREAT SERPL-MCNC: 0.52 MG/DL — SIGNIFICANT CHANGE UP (ref 0.5–1.3)
CREAT SERPL-MCNC: 0.53 MG/DL — SIGNIFICANT CHANGE UP (ref 0.5–1.3)
CREAT SERPL-MCNC: 0.71 MG/DL — SIGNIFICANT CHANGE UP (ref 0.5–1.3)
CULTURE RESULTS: SIGNIFICANT CHANGE UP
EGFR: 89 ML/MIN/1.73M2 — SIGNIFICANT CHANGE UP
EGFR: 89 ML/MIN/1.73M2 — SIGNIFICANT CHANGE UP
EGFR: 96 ML/MIN/1.73M2 — SIGNIFICANT CHANGE UP
EGFR: 96 ML/MIN/1.73M2 — SIGNIFICANT CHANGE UP
EGFR: 97 ML/MIN/1.73M2 — SIGNIFICANT CHANGE UP
EGFR: 97 ML/MIN/1.73M2 — SIGNIFICANT CHANGE UP
ESTIMATED AVERAGE GLUCOSE: 103 MG/DL — SIGNIFICANT CHANGE UP (ref 68–114)
GLUCOSE SERPL-MCNC: 106 MG/DL — HIGH (ref 70–99)
GLUCOSE SERPL-MCNC: 80 MG/DL — SIGNIFICANT CHANGE UP (ref 70–99)
GLUCOSE SERPL-MCNC: 95 MG/DL — SIGNIFICANT CHANGE UP (ref 70–99)
HCT VFR BLD CALC: 34 % — LOW (ref 34.5–45)
HGB BLD-MCNC: 11.9 G/DL — SIGNIFICANT CHANGE UP (ref 11.5–15.5)
MAGNESIUM SERPL-MCNC: 2 MG/DL — SIGNIFICANT CHANGE UP (ref 1.6–2.6)
MCHC RBC-ENTMCNC: 30 PG — SIGNIFICANT CHANGE UP (ref 27–34)
MCHC RBC-ENTMCNC: 35 G/DL — SIGNIFICANT CHANGE UP (ref 32–36)
MCV RBC AUTO: 85.6 FL — SIGNIFICANT CHANGE UP (ref 80–100)
NRBC # BLD AUTO: 0 K/UL — SIGNIFICANT CHANGE UP (ref 0–0)
NRBC # FLD: 0 K/UL — SIGNIFICANT CHANGE UP (ref 0–0)
NRBC BLD AUTO-RTO: 0 /100 WBCS — SIGNIFICANT CHANGE UP (ref 0–0)
OSMOLALITY SERPL: 266 MOSMOL/KG — LOW (ref 280–301)
PHOSPHATE SERPL-MCNC: 3.2 MG/DL — SIGNIFICANT CHANGE UP (ref 2.5–4.5)
PLATELET # BLD AUTO: 235 K/UL — SIGNIFICANT CHANGE UP (ref 150–400)
PMV BLD: 9.1 FL — SIGNIFICANT CHANGE UP (ref 7–13)
POTASSIUM SERPL-MCNC: 3.5 MMOL/L — SIGNIFICANT CHANGE UP (ref 3.5–5.3)
POTASSIUM SERPL-MCNC: 3.5 MMOL/L — SIGNIFICANT CHANGE UP (ref 3.5–5.3)
POTASSIUM SERPL-MCNC: 3.9 MMOL/L — SIGNIFICANT CHANGE UP (ref 3.5–5.3)
POTASSIUM SERPL-SCNC: 3.5 MMOL/L — SIGNIFICANT CHANGE UP (ref 3.5–5.3)
POTASSIUM SERPL-SCNC: 3.5 MMOL/L — SIGNIFICANT CHANGE UP (ref 3.5–5.3)
POTASSIUM SERPL-SCNC: 3.9 MMOL/L — SIGNIFICANT CHANGE UP (ref 3.5–5.3)
RBC # BLD: 3.97 M/UL — SIGNIFICANT CHANGE UP (ref 3.8–5.2)
RBC # FLD: 13 % — SIGNIFICANT CHANGE UP (ref 10.3–14.5)
SODIUM SERPL-SCNC: 131 MMOL/L — LOW (ref 135–145)
SODIUM SERPL-SCNC: 134 MMOL/L — LOW (ref 135–145)
SODIUM SERPL-SCNC: 134 MMOL/L — LOW (ref 135–145)
SPECIMEN SOURCE: SIGNIFICANT CHANGE UP
T3 SERPL-MCNC: 66 NG/DL — LOW (ref 80–200)
T4 AB SER-ACNC: 9.1 UG/DL — SIGNIFICANT CHANGE UP (ref 4.6–12)
T4 FREE SERPL-MCNC: 1.6 NG/DL — SIGNIFICANT CHANGE UP (ref 0.9–1.8)
TSH SERPL-MCNC: 0.6 UIU/ML — SIGNIFICANT CHANGE UP (ref 0.27–4.2)
UUN UR-MCNC: 146 MG/DL — SIGNIFICANT CHANGE UP
WBC # BLD: 6.72 K/UL — SIGNIFICANT CHANGE UP (ref 3.8–10.5)
WBC # FLD AUTO: 6.72 K/UL — SIGNIFICANT CHANGE UP (ref 3.8–10.5)

## 2025-06-27 PROCEDURE — 85018 HEMOGLOBIN: CPT

## 2025-06-27 PROCEDURE — 84132 ASSAY OF SERUM POTASSIUM: CPT

## 2025-06-27 PROCEDURE — 83935 ASSAY OF URINE OSMOLALITY: CPT

## 2025-06-27 PROCEDURE — 84540 ASSAY OF URINE/UREA-N: CPT

## 2025-06-27 PROCEDURE — 84300 ASSAY OF URINE SODIUM: CPT

## 2025-06-27 PROCEDURE — 83036 HEMOGLOBIN GLYCOSYLATED A1C: CPT

## 2025-06-27 PROCEDURE — 83690 ASSAY OF LIPASE: CPT

## 2025-06-27 PROCEDURE — 84156 ASSAY OF PROTEIN URINE: CPT

## 2025-06-27 PROCEDURE — 87086 URINE CULTURE/COLONY COUNT: CPT

## 2025-06-27 PROCEDURE — 84100 ASSAY OF PHOSPHORUS: CPT

## 2025-06-27 PROCEDURE — 82330 ASSAY OF CALCIUM: CPT

## 2025-06-27 PROCEDURE — 85027 COMPLETE CBC AUTOMATED: CPT

## 2025-06-27 PROCEDURE — 80048 BASIC METABOLIC PNL TOTAL CA: CPT

## 2025-06-27 PROCEDURE — 80053 COMPREHEN METABOLIC PANEL: CPT

## 2025-06-27 PROCEDURE — 84295 ASSAY OF SERUM SODIUM: CPT

## 2025-06-27 PROCEDURE — 82435 ASSAY OF BLOOD CHLORIDE: CPT

## 2025-06-27 PROCEDURE — 36415 COLL VENOUS BLD VENIPUNCTURE: CPT

## 2025-06-27 PROCEDURE — 84443 ASSAY THYROID STIM HORMONE: CPT

## 2025-06-27 PROCEDURE — 82803 BLOOD GASES ANY COMBINATION: CPT

## 2025-06-27 PROCEDURE — 82570 ASSAY OF URINE CREATININE: CPT

## 2025-06-27 PROCEDURE — 84480 ASSAY TRIIODOTHYRONINE (T3): CPT

## 2025-06-27 PROCEDURE — 84439 ASSAY OF FREE THYROXINE: CPT

## 2025-06-27 PROCEDURE — 84133 ASSAY OF URINE POTASSIUM: CPT

## 2025-06-27 PROCEDURE — 84436 ASSAY OF TOTAL THYROXINE: CPT

## 2025-06-27 PROCEDURE — 83605 ASSAY OF LACTIC ACID: CPT

## 2025-06-27 PROCEDURE — 81003 URINALYSIS AUTO W/O SCOPE: CPT

## 2025-06-27 PROCEDURE — 83930 ASSAY OF BLOOD OSMOLALITY: CPT

## 2025-06-27 PROCEDURE — 82947 ASSAY GLUCOSE BLOOD QUANT: CPT

## 2025-06-27 PROCEDURE — 99232 SBSQ HOSP IP/OBS MODERATE 35: CPT | Mod: GC

## 2025-06-27 PROCEDURE — 85025 COMPLETE CBC W/AUTO DIFF WBC: CPT

## 2025-06-27 PROCEDURE — 83735 ASSAY OF MAGNESIUM: CPT

## 2025-06-27 PROCEDURE — 85014 HEMATOCRIT: CPT

## 2025-06-27 PROCEDURE — 74177 CT ABD & PELVIS W/CONTRAST: CPT

## 2025-06-27 RX ORDER — SODIUM CHLORIDE 9 G/1000ML
1000 INJECTION, SOLUTION INTRAVENOUS
Refills: 0 | Status: DISCONTINUED | OUTPATIENT
Start: 2025-06-27 | End: 2025-06-27

## 2025-06-27 RX ORDER — ASPIRIN 325 MG
81 TABLET ORAL DAILY
Refills: 0 | Status: DISCONTINUED | OUTPATIENT
Start: 2025-06-27 | End: 2025-06-28

## 2025-06-27 RX ORDER — POLYETHYLENE GLYCOL 3350 17 G/17G
17 POWDER, FOR SOLUTION ORAL DAILY
Refills: 0 | Status: DISCONTINUED | OUTPATIENT
Start: 2025-06-27 | End: 2025-06-28

## 2025-06-27 RX ORDER — SENNA 187 MG
2 TABLET ORAL AT BEDTIME
Refills: 0 | Status: DISCONTINUED | OUTPATIENT
Start: 2025-06-27 | End: 2025-06-28

## 2025-06-27 RX ORDER — LOSARTAN POTASSIUM 100 MG/1
50 TABLET, FILM COATED ORAL DAILY
Refills: 0 | Status: DISCONTINUED | OUTPATIENT
Start: 2025-06-27 | End: 2025-06-28

## 2025-06-27 RX ORDER — LEVOTHYROXINE SODIUM 300 MCG
88 TABLET ORAL DAILY
Refills: 0 | Status: DISCONTINUED | OUTPATIENT
Start: 2025-06-27 | End: 2025-06-28

## 2025-06-27 RX ORDER — AMLODIPINE BESYLATE 10 MG/1
5 TABLET ORAL DAILY
Refills: 0 | Status: DISCONTINUED | OUTPATIENT
Start: 2025-06-27 | End: 2025-06-28

## 2025-06-27 RX ADMIN — ENOXAPARIN SODIUM 40 MILLIGRAM(S): 100 INJECTION SUBCUTANEOUS at 06:43

## 2025-06-27 RX ADMIN — AMLODIPINE BESYLATE 5 MILLIGRAM(S): 10 TABLET ORAL at 06:42

## 2025-06-27 RX ADMIN — SODIUM CHLORIDE 50 MILLILITER(S): 9 INJECTION, SOLUTION INTRAVENOUS at 09:23

## 2025-06-27 RX ADMIN — AMPICILLIN SODIUM AND SULBACTAM SODIUM 200 GRAM(S): 1; .5 INJECTION, POWDER, FOR SOLUTION INTRAMUSCULAR; INTRAVENOUS at 01:36

## 2025-06-27 RX ADMIN — AMPICILLIN SODIUM AND SULBACTAM SODIUM 200 GRAM(S): 1; .5 INJECTION, POWDER, FOR SOLUTION INTRAMUSCULAR; INTRAVENOUS at 06:43

## 2025-06-27 RX ADMIN — Medication 2 TABLET(S): at 21:59

## 2025-06-27 RX ADMIN — POLYETHYLENE GLYCOL 3350 17 GRAM(S): 17 POWDER, FOR SOLUTION ORAL at 11:11

## 2025-06-27 RX ADMIN — SODIUM CHLORIDE 50 MILLILITER(S): 9 INJECTION, SOLUTION INTRAVENOUS at 18:02

## 2025-06-27 RX ADMIN — LOSARTAN POTASSIUM 50 MILLIGRAM(S): 100 TABLET, FILM COATED ORAL at 06:43

## 2025-06-27 RX ADMIN — Medication 81 MILLIGRAM(S): at 11:11

## 2025-06-27 RX ADMIN — AMPICILLIN SODIUM AND SULBACTAM SODIUM 200 GRAM(S): 1; .5 INJECTION, POWDER, FOR SOLUTION INTRAMUSCULAR; INTRAVENOUS at 12:33

## 2025-06-27 RX ADMIN — Medication 88 MICROGRAM(S): at 06:42

## 2025-06-27 RX ADMIN — AMPICILLIN SODIUM AND SULBACTAM SODIUM 200 GRAM(S): 1; .5 INJECTION, POWDER, FOR SOLUTION INTRAMUSCULAR; INTRAVENOUS at 18:02

## 2025-06-28 ENCOUNTER — TRANSCRIPTION ENCOUNTER (OUTPATIENT)
Age: 75
End: 2025-06-28

## 2025-06-28 VITALS
DIASTOLIC BLOOD PRESSURE: 86 MMHG | RESPIRATION RATE: 18 BRPM | HEART RATE: 78 BPM | SYSTOLIC BLOOD PRESSURE: 132 MMHG | TEMPERATURE: 98 F | OXYGEN SATURATION: 97 %

## 2025-06-28 LAB
ALBUMIN SERPL ELPH-MCNC: 3 G/DL — LOW (ref 3.3–5)
ALP SERPL-CCNC: 50 U/L — SIGNIFICANT CHANGE UP (ref 40–120)
ALT FLD-CCNC: 12 U/L — SIGNIFICANT CHANGE UP (ref 10–45)
ANION GAP SERPL CALC-SCNC: 11 MMOL/L — SIGNIFICANT CHANGE UP (ref 5–17)
AST SERPL-CCNC: 17 U/L — SIGNIFICANT CHANGE UP (ref 10–40)
BASOPHILS # BLD AUTO: 0.05 K/UL — SIGNIFICANT CHANGE UP (ref 0–0.2)
BASOPHILS NFR BLD AUTO: 0.9 % — SIGNIFICANT CHANGE UP (ref 0–2)
BILIRUB SERPL-MCNC: 0.4 MG/DL — SIGNIFICANT CHANGE UP (ref 0.2–1.2)
BUN SERPL-MCNC: 14 MG/DL — SIGNIFICANT CHANGE UP (ref 7–23)
CALCIUM SERPL-MCNC: 8.3 MG/DL — LOW (ref 8.4–10.5)
CHLORIDE SERPL-SCNC: 101 MMOL/L — SIGNIFICANT CHANGE UP (ref 96–108)
CO2 SERPL-SCNC: 22 MMOL/L — SIGNIFICANT CHANGE UP (ref 22–31)
CREAT SERPL-MCNC: 0.64 MG/DL — SIGNIFICANT CHANGE UP (ref 0.5–1.3)
EGFR: 92 ML/MIN/1.73M2 — SIGNIFICANT CHANGE UP
EGFR: 92 ML/MIN/1.73M2 — SIGNIFICANT CHANGE UP
EOSINOPHIL # BLD AUTO: 0.14 K/UL — SIGNIFICANT CHANGE UP (ref 0–0.5)
EOSINOPHIL NFR BLD AUTO: 2.5 % — SIGNIFICANT CHANGE UP (ref 0–6)
GLUCOSE SERPL-MCNC: 89 MG/DL — SIGNIFICANT CHANGE UP (ref 70–99)
HCT VFR BLD CALC: 32.1 % — LOW (ref 34.5–45)
HGB BLD-MCNC: 11.3 G/DL — LOW (ref 11.5–15.5)
IMM GRANULOCYTES # BLD AUTO: 0.02 K/UL — SIGNIFICANT CHANGE UP (ref 0–0.07)
IMM GRANULOCYTES NFR BLD AUTO: 0.4 % — SIGNIFICANT CHANGE UP (ref 0–0.9)
LYMPHOCYTES # BLD AUTO: 1.34 K/UL — SIGNIFICANT CHANGE UP (ref 1–3.3)
LYMPHOCYTES NFR BLD AUTO: 23.7 % — SIGNIFICANT CHANGE UP (ref 13–44)
MAGNESIUM SERPL-MCNC: 2.1 MG/DL — SIGNIFICANT CHANGE UP (ref 1.6–2.6)
MCHC RBC-ENTMCNC: 29.9 PG — SIGNIFICANT CHANGE UP (ref 27–34)
MCHC RBC-ENTMCNC: 35.2 G/DL — SIGNIFICANT CHANGE UP (ref 32–36)
MCV RBC AUTO: 84.9 FL — SIGNIFICANT CHANGE UP (ref 80–100)
MONOCYTES # BLD AUTO: 0.7 K/UL — SIGNIFICANT CHANGE UP (ref 0–0.9)
MONOCYTES NFR BLD AUTO: 12.4 % — SIGNIFICANT CHANGE UP (ref 2–14)
NEUTROPHILS # BLD AUTO: 3.4 K/UL — SIGNIFICANT CHANGE UP (ref 1.8–7.4)
NEUTROPHILS NFR BLD AUTO: 60.1 % — SIGNIFICANT CHANGE UP (ref 43–77)
NRBC # BLD AUTO: 0 K/UL — SIGNIFICANT CHANGE UP (ref 0–0)
NRBC # FLD: 0 K/UL — SIGNIFICANT CHANGE UP (ref 0–0)
NRBC BLD AUTO-RTO: 0 /100 WBCS — SIGNIFICANT CHANGE UP (ref 0–0)
PHOSPHATE SERPL-MCNC: 3 MG/DL — SIGNIFICANT CHANGE UP (ref 2.5–4.5)
PLATELET # BLD AUTO: 248 K/UL — SIGNIFICANT CHANGE UP (ref 150–400)
PMV BLD: 9.3 FL — SIGNIFICANT CHANGE UP (ref 7–13)
POTASSIUM SERPL-MCNC: 3.5 MMOL/L — SIGNIFICANT CHANGE UP (ref 3.5–5.3)
POTASSIUM SERPL-SCNC: 3.5 MMOL/L — SIGNIFICANT CHANGE UP (ref 3.5–5.3)
PROT SERPL-MCNC: 6.3 G/DL — SIGNIFICANT CHANGE UP (ref 6–8.3)
RBC # BLD: 3.78 M/UL — LOW (ref 3.8–5.2)
RBC # FLD: 13.2 % — SIGNIFICANT CHANGE UP (ref 10.3–14.5)
SODIUM SERPL-SCNC: 134 MMOL/L — LOW (ref 135–145)
WBC # BLD: 5.65 K/UL — SIGNIFICANT CHANGE UP (ref 3.8–10.5)
WBC # FLD AUTO: 5.65 K/UL — SIGNIFICANT CHANGE UP (ref 3.8–10.5)

## 2025-06-28 PROCEDURE — 83036 HEMOGLOBIN GLYCOSYLATED A1C: CPT

## 2025-06-28 PROCEDURE — 84300 ASSAY OF URINE SODIUM: CPT

## 2025-06-28 PROCEDURE — 82803 BLOOD GASES ANY COMBINATION: CPT

## 2025-06-28 PROCEDURE — 36415 COLL VENOUS BLD VENIPUNCTURE: CPT

## 2025-06-28 PROCEDURE — 84295 ASSAY OF SERUM SODIUM: CPT

## 2025-06-28 PROCEDURE — 82570 ASSAY OF URINE CREATININE: CPT

## 2025-06-28 PROCEDURE — 81003 URINALYSIS AUTO W/O SCOPE: CPT

## 2025-06-28 PROCEDURE — 87086 URINE CULTURE/COLONY COUNT: CPT

## 2025-06-28 PROCEDURE — 84439 ASSAY OF FREE THYROXINE: CPT

## 2025-06-28 PROCEDURE — 99239 HOSP IP/OBS DSCHRG MGMT >30: CPT

## 2025-06-28 PROCEDURE — 83735 ASSAY OF MAGNESIUM: CPT

## 2025-06-28 PROCEDURE — 83930 ASSAY OF BLOOD OSMOLALITY: CPT

## 2025-06-28 PROCEDURE — 74177 CT ABD & PELVIS W/CONTRAST: CPT

## 2025-06-28 PROCEDURE — 96374 THER/PROPH/DIAG INJ IV PUSH: CPT

## 2025-06-28 PROCEDURE — 82330 ASSAY OF CALCIUM: CPT

## 2025-06-28 PROCEDURE — 84436 ASSAY OF TOTAL THYROXINE: CPT

## 2025-06-28 PROCEDURE — 80048 BASIC METABOLIC PNL TOTAL CA: CPT

## 2025-06-28 PROCEDURE — 96361 HYDRATE IV INFUSION ADD-ON: CPT

## 2025-06-28 PROCEDURE — 74018 RADEX ABDOMEN 1 VIEW: CPT

## 2025-06-28 PROCEDURE — 82435 ASSAY OF BLOOD CHLORIDE: CPT

## 2025-06-28 PROCEDURE — 84100 ASSAY OF PHOSPHORUS: CPT

## 2025-06-28 PROCEDURE — 84133 ASSAY OF URINE POTASSIUM: CPT

## 2025-06-28 PROCEDURE — 85027 COMPLETE CBC AUTOMATED: CPT

## 2025-06-28 PROCEDURE — 82947 ASSAY GLUCOSE BLOOD QUANT: CPT

## 2025-06-28 PROCEDURE — 83935 ASSAY OF URINE OSMOLALITY: CPT

## 2025-06-28 PROCEDURE — 84156 ASSAY OF PROTEIN URINE: CPT

## 2025-06-28 PROCEDURE — 83605 ASSAY OF LACTIC ACID: CPT

## 2025-06-28 PROCEDURE — 84443 ASSAY THYROID STIM HORMONE: CPT

## 2025-06-28 PROCEDURE — 83690 ASSAY OF LIPASE: CPT

## 2025-06-28 PROCEDURE — 84540 ASSAY OF URINE/UREA-N: CPT

## 2025-06-28 PROCEDURE — 84132 ASSAY OF SERUM POTASSIUM: CPT

## 2025-06-28 PROCEDURE — 80053 COMPREHEN METABOLIC PANEL: CPT

## 2025-06-28 PROCEDURE — 99285 EMERGENCY DEPT VISIT HI MDM: CPT

## 2025-06-28 PROCEDURE — 85014 HEMATOCRIT: CPT

## 2025-06-28 PROCEDURE — 84480 ASSAY TRIIODOTHYRONINE (T3): CPT

## 2025-06-28 PROCEDURE — 74018 RADEX ABDOMEN 1 VIEW: CPT | Mod: 26

## 2025-06-28 PROCEDURE — 85018 HEMOGLOBIN: CPT

## 2025-06-28 PROCEDURE — 85025 COMPLETE CBC W/AUTO DIFF WBC: CPT

## 2025-06-28 RX ORDER — LACTULOSE 10 G/15ML
10 SOLUTION ORAL THREE TIMES A DAY
Refills: 0 | Status: DISCONTINUED | OUTPATIENT
Start: 2025-06-28 | End: 2025-06-28

## 2025-06-28 RX ORDER — BISACODYL 5 MG
5 TABLET, DELAYED RELEASE (ENTERIC COATED) ORAL ONCE
Refills: 0 | Status: COMPLETED | OUTPATIENT
Start: 2025-06-28 | End: 2025-06-28

## 2025-06-28 RX ORDER — POLYETHYLENE GLYCOL 3350 17 G/17G
17 POWDER, FOR SOLUTION ORAL
Qty: 510 | Refills: 0
Start: 2025-06-28 | End: 2025-07-27

## 2025-06-28 RX ORDER — BISACODYL 5 MG
1 TABLET, DELAYED RELEASE (ENTERIC COATED) ORAL
Qty: 30 | Refills: 0
Start: 2025-06-28 | End: 2025-07-27

## 2025-06-28 RX ORDER — SENNA 187 MG
2 TABLET ORAL
Qty: 60 | Refills: 0
Start: 2025-06-28 | End: 2025-07-27

## 2025-06-28 RX ORDER — AMOXICILLIN AND CLAVULANATE POTASSIUM 500; 125 MG/1; MG/1
1 TABLET, FILM COATED ORAL
Qty: 8 | Refills: 0
Start: 2025-06-28 | End: 2025-07-01

## 2025-06-28 RX ADMIN — LACTULOSE 10 GRAM(S): 10 SOLUTION ORAL at 11:39

## 2025-06-28 RX ADMIN — POLYETHYLENE GLYCOL 3350 17 GRAM(S): 17 POWDER, FOR SOLUTION ORAL at 11:38

## 2025-06-28 RX ADMIN — ENOXAPARIN SODIUM 40 MILLIGRAM(S): 100 INJECTION SUBCUTANEOUS at 06:06

## 2025-06-28 RX ADMIN — AMPICILLIN SODIUM AND SULBACTAM SODIUM 200 GRAM(S): 1; .5 INJECTION, POWDER, FOR SOLUTION INTRAMUSCULAR; INTRAVENOUS at 12:20

## 2025-06-28 RX ADMIN — AMPICILLIN SODIUM AND SULBACTAM SODIUM 200 GRAM(S): 1; .5 INJECTION, POWDER, FOR SOLUTION INTRAMUSCULAR; INTRAVENOUS at 06:04

## 2025-06-28 RX ADMIN — AMLODIPINE BESYLATE 5 MILLIGRAM(S): 10 TABLET ORAL at 06:02

## 2025-06-28 RX ADMIN — Medication 81 MILLIGRAM(S): at 11:42

## 2025-06-28 RX ADMIN — AMPICILLIN SODIUM AND SULBACTAM SODIUM 200 GRAM(S): 1; .5 INJECTION, POWDER, FOR SOLUTION INTRAMUSCULAR; INTRAVENOUS at 00:49

## 2025-06-28 RX ADMIN — Medication 88 MICROGRAM(S): at 06:02

## 2025-06-28 RX ADMIN — LOSARTAN POTASSIUM 50 MILLIGRAM(S): 100 TABLET, FILM COATED ORAL at 06:02

## 2025-06-28 RX ADMIN — Medication 5 MILLIGRAM(S): at 11:39

## 2025-06-30 PROBLEM — E03.9 HYPOTHYROIDISM, UNSPECIFIED: Chronic | Status: ACTIVE | Noted: 2025-06-26

## 2025-07-08 ENCOUNTER — APPOINTMENT (OUTPATIENT)
Dept: INTERNAL MEDICINE | Facility: CLINIC | Age: 75
End: 2025-07-08

## (undated) DEVICE — ELCTR GROUNDING PAD ADULT COVIDIEN

## (undated) DEVICE — ELCTR ECG CONDUCTIVE ADHESIVE

## (undated) DEVICE — TUBING IV SET GRAVITY 3Y 100" MACRO

## (undated) DEVICE — SALIVA EJECTOR (BLUE)

## (undated) DEVICE — BIOPSY FORCEP COLD DISP

## (undated) DEVICE — TUBING SUCTION NONCONDUCTIVE 6MM X 12FT

## (undated) DEVICE — DRSG CURITY GAUZE SPONGE 4 X 4" 12-PLY NON-STERILE

## (undated) DEVICE — SNARE EXACTO COLD 9MMX230CM

## (undated) DEVICE — BIOPSY FORCEP RADIAL JAW 4 STANDARD WITH NEEDLE

## (undated) DEVICE — BASIN EMESIS 10IN GRADUATED MAUVE

## (undated) DEVICE — CATH IV SAFE BC 22G X 1" (BLUE)

## (undated) DEVICE — LUBRICATING JELLY HR ONE SHOT 3G

## (undated) DEVICE — CONTAINER FORMALIN 80ML YELLOW

## (undated) DEVICE — GOWN LG

## (undated) DEVICE — DRSG BANDAID 0.75X3"

## (undated) DEVICE — DRSG 2X2

## (undated) DEVICE — PACK IV START WITH CHG

## (undated) DEVICE — TUBING MEDI-VAC W MAXIGRIP CONNECTORS 1/4"X6'

## (undated) DEVICE — POLY TRAP ETRAP